# Patient Record
(demographics unavailable — no encounter records)

---

## 2017-06-05 NOTE — EMERGENCY DEPARTMENT REPORT
ED Female  HPI





- General


Chief complaint: Nausea/Vomiting/Diarrhea


Stated complaint: N&V


Time Seen by Provider: 06/05/17 10:44


Source: patient


Mode of arrival: Ambulatory


Limitations: No Limitations





- History of Present Illness


Initial comments: 





Patient states she is 17 weeks and started having one week of cramping pelvic 

pain with nausea vomiting and diarrhea.  Patient denies vaginal bleeding or 

vaginal discharge.  Also denies dysuria or flank pain.


-: Gradual, This morning (resolved now)


Radiation: suprapubic


Severity: mild


Quality: cramping





- Related Data


 Home Medications











 Medication  Instructions  Recorded  Confirmed  Last Taken


 


Sertraline HCl [Zoloft] 25 mg PO BID 03/29/15 03/29/15 05/01/13








 Previous Rx's











 Medication  Instructions  Recorded  Last Taken  Type


 


Docusate Sodium [Colace] 100 mg PO BID PRN #60 capsule 03/31/15 Unknown Rx


 


Ferrous Sulfate [Feosol 325 MG tab] 325 mg PO BID #120 tablet 03/31/15 Unknown 

Rx


 


Ibuprofen [Motrin 800 MG tab] 800 mg PO TID PRN #30 tablet 03/31/15 Unknown Rx


 


Labetalol [Normodyne TAB] 400 mg PO BID #60 tablet 03/31/15 Unknown Rx


 


Sertraline [Zoloft] 50 mg PO QDAY #30 tablet 03/31/15 Unknown Rx


 


Ibuprofen [Motrin 800 MG tab] 800 mg PO Q8HR PRN #30 tablet 09/23/15 Unknown Rx


 


metroNIDAZOLE [Flagyl TAB] 500 mg PO Q12HR #14 tab 09/23/15 Unknown Rx


 


Doxylamine/Pyridoxine HCl 1 each PO TID #20 tablet. 06/05/17 Unknown Rx





[Diclegis Dr 10-10 mg Tablet]    











 Allergies











Allergy/AdvReac Type Severity Reaction Status Date / Time


 


latex Allergy Intermediate Itching Verified 03/29/15 06:44














ED Review of Systems


ROS: 


Stated complaint: N&V


Other details as noted in HPI





Constitutional: denies: chills, fever


Eyes: denies: eye pain, eye discharge, vision change


ENT: denies: ear pain, throat pain


Respiratory: denies: cough, shortness of breath, SOB with exertion, SOB at rest

, wheezing


Cardiovascular: denies: chest pain, palpitations


Endocrine: no symptoms reported


Gastrointestinal: abdominal pain (crampy suprapubic pain intermittent, resolved 

now), nausea, vomiting.  denies: diarrhea, constipation, hematemesis, melena


Genitourinary: other (patient denies vaginal discharge, vaginal bleeding.).  

denies: urgency, dysuria, frequency, hematuria, discharge, abnormal menses, 

dyspareunia


Musculoskeletal: denies: back pain, joint swelling, arthralgia


Skin: denies: rash, lesions


Neurological: denies: headache, weakness, paresthesias


Psychiatric: denies: anxiety, depression


Hematological/Lymphatic: denies: easy bleeding, easy bruising





ED Past Medical Hx





- Past Medical History


Previous Medical History?: Yes


Hx Hypertension: No


Hx Congestive Heart Failure: No


Hx Diabetes: No


Hx Deep Vein Thrombosis: No


Hx Renal Disease: No


Hx Sickle Cell Disease: No


Hx Seizures: No


Hx Psychiatric Treatment: Yes (depression)


Hx Asthma:  (3 mos ago)


Hx COPD: No


Hx HIV: No





- Surgical History


Past Surgical History?: No





- Social History


Smoking Status: Never Smoker


Substance Use Type: Marijuana





- Medications


Home Medications: 


 Home Medications











 Medication  Instructions  Recorded  Confirmed  Last Taken  Type


 


Sertraline HCl [Zoloft] 25 mg PO BID 03/29/15 03/29/15 05/01/13 History


 


Docusate Sodium [Colace] 100 mg PO BID PRN #60 capsule 03/31/15  Unknown Rx


 


Ferrous Sulfate [Feosol 325 MG tab] 325 mg PO BID #120 tablet 03/31/15  Unknown 

Rx


 


Ibuprofen [Motrin 800 MG tab] 800 mg PO TID PRN #30 tablet 03/31/15  Unknown Rx


 


Labetalol [Normodyne TAB] 400 mg PO BID #60 tablet 03/31/15  Unknown Rx


 


Sertraline [Zoloft] 50 mg PO QDAY #30 tablet 03/31/15  Unknown Rx


 


Ibuprofen [Motrin 800 MG tab] 800 mg PO Q8HR PRN #30 tablet 09/23/15  Unknown Rx


 


metroNIDAZOLE [Flagyl TAB] 500 mg PO Q12HR #14 tab 09/23/15  Unknown Rx


 


Doxylamine/Pyridoxine HCl 1 each PO TID #20 tablet. 06/05/17  Unknown Rx





[Diclegis Dr 10-10 mg Tablet]     














ED Physical Exam





- General


Limitations: No Limitations


General appearance: alert, in no apparent distress





- Head


Head exam: Present: atraumatic, normocephalic





- Eye


Eye exam: Present: normal appearance, PERRL, EOMI


Pupils: Present: normal accommodation





- ENT


ENT exam: Present: mucous membranes moist





- Neck


Neck exam: Present: normal inspection





- Respiratory


Respiratory exam: Present: normal lung sounds bilaterally.  Absent: respiratory 

distress, wheezes, rales, rhonchi, stridor





- Cardiovascular


Cardiovascular Exam: Present: regular rate.  Absent: systolic murmur, diastolic 

murmur, rubs, gallop





- GI/Abdominal


GI/Abdominal exam: Present: soft, normal bowel sounds.  Absent: distended, 

tenderness, guarding, rebound, rigid





- Extremities Exam


Extremities exam: Present: normal inspection





- Back Exam


Back exam: Present: normal inspection





- Neurological Exam


Neurological exam: Present: alert, oriented X3





- Psychiatric


Psychiatric exam: Present: normal affect, normal mood





- Skin


Skin exam: Present: warm, dry, intact, normal color.  Absent: rash





ED Course


 Vital Signs











  06/05/17 06/05/17





  08:56 15:00


 


Temperature 98.6 F 


 


Pulse Rate 98 H 81


 


Respiratory 22 20





Rate  


 


Blood Pressure 114/70 


 


Blood Pressure  117/71





[Right]  


 


O2 Sat by Pulse 100 100





Oximetry  














ED Medical Decision Making





- Lab Data


Result diagrams: 


 06/05/17 09:31





 06/05/17 09:31





- Medical Decision Making





Patient deferring pelvic exam, states she has appointment with her OB/GYN this 

week.


Critical care attestation.: 


If time is entered above; I have spent that time in minutes in the direct care 

of this critically ill patient, excluding procedure time.








ED Disposition


Clinical Impression: 


 Hyperemesis gravidarum





Disposition: DISCHARGED TO HOME OR SELFCARE


Is pt being admited?: No


Condition: Stable


Instructions:  Hyperemesis Gravidarum (ED)


Prescriptions: 


Doxylamine/Pyridoxine HCl [Diclegis Dr 10-10 mg Tablet] 1 each PO TID #20 

tablet.


Referrals: 


PRIMARY CARE,MD [Primary Care Provider] - 3-5 Days


Forms:  Work/School Release Form(ED)

## 2017-06-05 NOTE — ULTRASOUND REPORT
OB ULTRASOUND



History: Pelvic pain during pregnancy.



Technique: Transabdominal ultrasound with Doppler interrogation.



Gestation: Single



Fetal Position: Cephalic



Amniotic Fluid: Within normal limits

  EMMA =  cm



Placenta: Anterior

  Placental Grade: 0

No evidence for abruption.



Fetal Heart Rate:

  154 BPM



Cervical length: 4.0 cm (Normal > 3 cm)





BPD: 3.4 cm = 16 w 3 d



 HC: 12.3 cm = 16 w 1 d



 AC: 10.6 cm = 16 w 4 d



 FL: 2.3 cm = 16 w 5 d



HC/AC Ratio: 1.16



Cephalic Index: 85.1



Estimated Fetal Weight: 163 grams





US Gest. Age = 16 w 3 d      EDC: 11/17/17
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
